# Patient Record
Sex: FEMALE | Race: WHITE | NOT HISPANIC OR LATINO | Employment: OTHER | URBAN - METROPOLITAN AREA
[De-identification: names, ages, dates, MRNs, and addresses within clinical notes are randomized per-mention and may not be internally consistent; named-entity substitution may affect disease eponyms.]

---

## 2024-10-21 ENCOUNTER — OFFICE VISIT (OUTPATIENT)
Dept: RHEUMATOLOGY | Facility: MEDICAL CENTER | Age: 49
End: 2024-10-21
Attending: STUDENT IN AN ORGANIZED HEALTH CARE EDUCATION/TRAINING PROGRAM
Payer: MEDICARE

## 2024-10-21 ENCOUNTER — APPOINTMENT (OUTPATIENT)
Dept: LAB | Facility: MEDICAL CENTER | Age: 49
End: 2024-10-21
Attending: STUDENT IN AN ORGANIZED HEALTH CARE EDUCATION/TRAINING PROGRAM
Payer: MEDICARE

## 2024-10-21 VITALS
HEIGHT: 61 IN | DIASTOLIC BLOOD PRESSURE: 78 MMHG | BODY MASS INDEX: 33.61 KG/M2 | SYSTOLIC BLOOD PRESSURE: 124 MMHG | TEMPERATURE: 98.1 F | WEIGHT: 178 LBS | HEART RATE: 76 BPM | OXYGEN SATURATION: 96 %

## 2024-10-21 DIAGNOSIS — Z79.1 NSAID LONG-TERM USE: ICD-10-CM

## 2024-10-21 DIAGNOSIS — R76.8 SEROLOGIC AUTOIMMUNITY: Primary | ICD-10-CM

## 2024-10-21 DIAGNOSIS — M25.50 ARTHRALGIA OF MULTIPLE JOINTS: ICD-10-CM

## 2024-10-21 PROCEDURE — 99204 OFFICE O/P NEW MOD 45 MIN: CPT | Performed by: STUDENT IN AN ORGANIZED HEALTH CARE EDUCATION/TRAINING PROGRAM

## 2024-10-21 PROCEDURE — 3074F SYST BP LT 130 MM HG: CPT | Performed by: STUDENT IN AN ORGANIZED HEALTH CARE EDUCATION/TRAINING PROGRAM

## 2024-10-21 PROCEDURE — 3078F DIAST BP <80 MM HG: CPT | Performed by: STUDENT IN AN ORGANIZED HEALTH CARE EDUCATION/TRAINING PROGRAM

## 2024-10-21 PROCEDURE — 99202 OFFICE O/P NEW SF 15 MIN: CPT | Performed by: STUDENT IN AN ORGANIZED HEALTH CARE EDUCATION/TRAINING PROGRAM

## 2024-10-21 RX ORDER — LISINOPRIL 40 MG/1
40 TABLET ORAL DAILY
COMMUNITY

## 2024-10-21 RX ORDER — OLOPATADINE HYDROCHLORIDE 2 MG/ML
SOLUTION/ DROPS OPHTHALMIC
COMMUNITY
Start: 2024-09-19

## 2024-10-21 RX ORDER — GALCANEZUMAB 120 MG/ML
INJECTION, SOLUTION SUBCUTANEOUS
COMMUNITY

## 2024-10-21 RX ORDER — HYDROCODONE BITARTRATE AND ACETAMINOPHEN 10; 325 MG/1; MG/1
1 TABLET ORAL
COMMUNITY
Start: 2024-09-20

## 2024-10-21 RX ORDER — BUPROPION HYDROCHLORIDE 150 MG/1
150 TABLET ORAL EVERY 24 HOURS
COMMUNITY
Start: 2024-09-21

## 2024-10-21 RX ORDER — RIZATRIPTAN BENZOATE 10 MG/1
TABLET, ORALLY DISINTEGRATING ORAL
COMMUNITY

## 2024-10-21 RX ORDER — CELECOXIB 200 MG/1
CAPSULE ORAL
COMMUNITY
Start: 2024-10-16

## 2024-10-21 RX ORDER — GLUCAGON HYDROCHLORIDE 1 MG
KIT INJECTION
COMMUNITY
Start: 2024-05-01

## 2024-10-21 ASSESSMENT — RHEUMATOLOGY NEW PATIENT QUESTIONNAIRE
NAUSEA: Y
EAR PAIN: Y
SUNLIGHT-INDUCED SKIN RASH: ARMS
FATIGUE: Y
CHARACTERISTIC: WORSE WITH ACTIVITY
DURATION: >1 HOUR
RINGING IN EARS: Y
LYMPH NODE SWELLING: ARMPITS
DIFFICULTY SWALLOWING: Y
MARK ALL THE AREAS OF PAIN: 108763813
HEARTBURN: Y
WEAKNESS: Y
JOINT INSTABILITY: Y
SPASMS: Y
ANXIETY: Y
COLD-INDUCED COLOR CHANGES (WHITE, PURPLE, RED ON REWARMING): TOES
NUMBNESS: Y
SKIN PLAQUES: Y
MUSCLE PAIN: Y
MALAISE: Y
ABDOMINAL PAIN: Y
DIZZINESS: Y
SUNLIGHT-INDUCED SKIN RASH: CHEST
COLD-INDUCED COLOR CHANGES (WHITE, PURPLE, RED ON REWARMING): FINGERS
JOINT PAIN: WORSE WITH ACTIVITY
PELVIC PAIN: Y
HEADACHES: Y
DRY EYES: Y
JOINT SWELLING: Y
VERTIGO: Y
INSOMNIA: Y
BODY ACHES: Y
EYE PAIN: Y
IRREGULAR HEARTBEATS: Y
DRY MOUTH: Y
GENITAL ULCERS: Y
ORAL ULCERS: Y
BURNING: Y
TEMPLE PAIN: Y

## 2024-11-13 ENCOUNTER — TELEPHONE (OUTPATIENT)
Dept: RHEUMATOLOGY | Facility: MEDICAL CENTER | Age: 49
End: 2024-11-13
Payer: MEDICARE

## 2024-11-13 NOTE — TELEPHONE ENCOUNTER
Pt called requesting the lab results that were done late Oct.     Results are in media.    Please advise and thank you

## 2024-11-26 ENCOUNTER — TELEPHONE (OUTPATIENT)
Dept: RHEUMATOLOGY | Facility: MEDICAL CENTER | Age: 49
End: 2024-11-26
Payer: MEDICARE

## 2024-11-26 ENCOUNTER — APPOINTMENT (OUTPATIENT)
Dept: RHEUMATOLOGY | Facility: MEDICAL CENTER | Age: 49
End: 2024-11-26
Attending: STUDENT IN AN ORGANIZED HEALTH CARE EDUCATION/TRAINING PROGRAM
Payer: MEDICARE

## 2024-11-26 NOTE — TELEPHONE ENCOUNTER
Pt called today stating she will not be back in [place] till after [place]. I explained that we are unable do out of state virtual appts so I rescheduled todays appt for earliest available in January 21st. In the meantime she did complete the additional testing ordered for her thyroid and would like to discuss results with Dr Hoover. She stated her primary looked at the results and informed her that there is definitely something going on with her thyroid possibly related to auto immune and ordered a biopsy on Dec 2nd. She was advised to follow up with her rheumatologist but due to not being able to come in she is wondering if she can receive a phone call? Please advise.

## 2024-12-08 ASSESSMENT — RHEUMATOLOGY FOLLOW-UP QUESTIONNAIRE
MUSCLE PAIN: Y
BURNING: Y
HEARTBURN: Y
EAR PAIN: Y
IRREGULAR HEARTBEATS: Y
DIZZINESS: Y
LYMPH NODE SWELLING: NECK
ANXIETY: Y
LYMPH NODE SWELLING: JAWS
DURATION: >1 HOUR
SNORING: Y
MALAISE: Y
MARK ALL THE AREAS OF PAIN: 111180991
WEAKNESS: Y
TENDON PAIN: Y
TEMPLE PAIN: Y
JOINT INSTABILITY: Y
TINGLING: Y
EYE PAIN: Y
THYROID SWELLING: Y
DRY EYES: Y
NUMBNESS: Y
JOINT PAIN: WORSE WITH ACTIVITY
INSOMNIA: Y
NAUSEA: Y
DRY MOUTH: Y
LYMPH NODE SWELLING: EARS
HEADACHES: Y
LYMPH NODE SWELLING: ARMPITS
COLD-INDUCED COLOR CHANGES (WHITE, PURPLE, RED ON REWARMING): TOES
FATIGUE: Y
CHARACTERISTIC: WORSE WITH ACTIVITY
JOINT SWELLING: Y
BODY ACHES: Y
RATE_1TO10: 7
THYROID PAIN: Y
VERTIGO: Y
SPASMS: Y
SHORTNESS OF BREATH: Y
GENITAL ULCERS: Y

## 2024-12-10 ENCOUNTER — OFFICE VISIT (OUTPATIENT)
Dept: RHEUMATOLOGY | Facility: MEDICAL CENTER | Age: 49
End: 2024-12-10
Attending: STUDENT IN AN ORGANIZED HEALTH CARE EDUCATION/TRAINING PROGRAM
Payer: MEDICARE

## 2024-12-10 VITALS
SYSTOLIC BLOOD PRESSURE: 110 MMHG | OXYGEN SATURATION: 92 % | HEART RATE: 69 BPM | BODY MASS INDEX: 34.96 KG/M2 | WEIGHT: 185 LBS | DIASTOLIC BLOOD PRESSURE: 70 MMHG | TEMPERATURE: 97.5 F | RESPIRATION RATE: 14 BRPM

## 2024-12-10 DIAGNOSIS — R76.8 SEROLOGIC AUTOIMMUNITY: Primary | ICD-10-CM

## 2024-12-10 DIAGNOSIS — Z79.1 NSAID LONG-TERM USE: ICD-10-CM

## 2024-12-10 DIAGNOSIS — M25.50 ARTHRALGIA OF MULTIPLE JOINTS: ICD-10-CM

## 2024-12-10 PROCEDURE — 99212 OFFICE O/P EST SF 10 MIN: CPT | Performed by: STUDENT IN AN ORGANIZED HEALTH CARE EDUCATION/TRAINING PROGRAM

## 2024-12-10 PROCEDURE — 3074F SYST BP LT 130 MM HG: CPT | Performed by: STUDENT IN AN ORGANIZED HEALTH CARE EDUCATION/TRAINING PROGRAM

## 2024-12-10 PROCEDURE — 99213 OFFICE O/P EST LOW 20 MIN: CPT | Performed by: STUDENT IN AN ORGANIZED HEALTH CARE EDUCATION/TRAINING PROGRAM

## 2024-12-10 PROCEDURE — 3078F DIAST BP <80 MM HG: CPT | Performed by: STUDENT IN AN ORGANIZED HEALTH CARE EDUCATION/TRAINING PROGRAM

## 2024-12-10 RX ORDER — FERROUS SULFATE 325(65) MG
10000 TABLET ORAL DAILY
COMMUNITY

## 2024-12-10 ASSESSMENT — PATIENT HEALTH QUESTIONNAIRE - PHQ9: CLINICAL INTERPRETATION OF PHQ2 SCORE: 0

## 2024-12-10 NOTE — PROGRESS NOTES
[facility] RHEUMATOLOGY  [address] [facility] [facility], [address], [place] [address]  Phone: [phone] ? Fax: [phone]  [facility]/Health-Services/Rheumatology    FOLLOW-UP VISIT NOTE      DATE OF SERVICE: 12/10/2024         Subjective     PRIMARY CARE PRACTITIONER:  Sharan Pradhan M.D.  [id number]  [facility] [id number]    PATIENT IDENTIFICATION:  Claudia Jordan  [address]  [facility] [address]    YOB: 1975    MEDICAL RECORD NUMBER: [id number]          CHIEF COMPLAINT:   Chief Complaint   Patient presents with    Follow-Up     Serologic autoimmunity       RHEUMATOLOGIC HISTORY:  Claudia Jordan is a 49 y.o. female with pertinent history notable for migraine headaches with bilateral occipital neuralgia, anxiety, lumbar spine degenerative disease, GERD, gastric bypass (2019), HTN, and polyarthralgia, who presents for follow up.     Initial consult 10/2024:  Reported a couple of years of polyarthralgia that mainly involved the neck, lower back, hips, right thumb, and left foot.  Neck pain exacerbated her migraines.  Under the care of pain management received RFA for the neck and lumbar spine degenerative disc disease.  Hip pain was more on the lateral aspect when she laid on her sides at night.  S/p SI joint injection in the past which she did not feel was particularly helpful.  S/p steroid injection to the right thumb by pain management for joint pain with activity.  History of plantar fasciitis of the left foot diagnosed by a podiatrist.  Reported swelling of the ankles that could occur throughout the day (L >R) and AM stiffness and bodyaches in the neck that lasted ~1.5 hours, improved with analgesics.  Reported cold induced purplish discoloration of her whole hand but mostly along the thumbs and cold sensitivity of the feet.  History of dry eyes x 1.5 years, used refresh at least 5-6x a day as recommended by her eye doctor as prednisone eyedrops were not effective.  She had punctal plugs x 3 in  the past which were not effective.  Her dry eyes was associated with redness, burning and sandpaper sensation.  Reported a 3-month history of dry mouth associated with painful lesions that are more like canker sores.  She had genital painful folliculitis that was been drained in the past.  No history of pathergy, parotid enlargement, photosensitive/malar rash, history of stroke, DVT/PE/multiple miscarriages, episodes of red painful photophobic eyes, nasal ulcerations, nonscarring alopecia, psoriasis, pleuritic chest pains or dyspnea on exertion.  Family history of arthritis in her father, and Sjogren's in her maternal cousin.  Pertinent treatments: Celecoxib 200 mg twice daily, diclofenac gel  Pertinent positive labs: 2024, SARA positive by IFA (1: 160, speckled pattern), antichromatin 37 (ref <20), C3 (elevated at 197)  Pertinent negative labs: 10/2024, SARA, early Sjogren's profile panel, anti-TG, TSH; 2024, anti-SSA/SSB, anti-dsDNA, anti-SM, anti-SCL 70, anti-U1 RNP, anticentromere, anti-CCP/RF, aldolase, CK, anticardiolipin Abs, C4, CRP/ESR, anti-TPO, TSH/free T4, 2023, BMP  Pertinent imagin/2024 left foot XR: Degenerative changes along the metatarsal head.  No calcification seen along the plantar fascia, small Achilles insertional plantar spur noted  2024 right hand XR: Osteophyte noted.  No erosive changes.  2023 MRI lumbar spine: R lateral disc bulge at L4-L5, with an annular fissure likely resulting R L4 nerve root encroachment with mild canal stenosis.  Disc bulge at L5-S1 could result in L L5 nerve root encroachment.  Facet arthrosis with fluid in the facets, most notable at L3 with facet arthrosis also present at L4-5 and L5-S1.   R hip/pelvis XR: Unremarkable  2022 MRI C-spine: Degenerative disc disease at the C5-6 and C6/7 level posterior disc osteophyte complex with narrowing of the anterior subarachnoid space but no cervical spinal stenosis.    INTERVAL HISTORY:  Reports  interval history as noted on the questionnaire below or scanned under media tab.    Patient presents to review results of recent labs.  She recently had an FNA of one of the thyroid nodules which showed benign findings.  No significant changes to previously reported symptoms.    [facility] Rheumatology Established Patient History Form    12/8/2024 11:33 PM PST - Filed by Patient   MAIN REASON FOR VISIT Auto immune Test Results, Thyroid   INTERVAL HISTORY OF ILLNESS   Date of worsening onset:    Preceding incident/ailment:    Describe/list your symptoms:    Exacerbating factors:    Alleviating factors:    Helpful medications:    Ineffective medications:    Severity of pain (scale of 1-10): 7   Personal/emotional stressors:    E[place] All The Areas Of Pain    REVIEW OF SYMPTOMS    General   Fevers    Chills    Night sweats    Malaise Yes   Fatigue Yes   Unintentional weight loss    Musculoskeletal   Joint pain Worse with activity   Morning stiffness duration >1 hour   Morning stiffness characteristic Worse with activity   Joint swelling Yes   Joint instability Yes   Tendon pain Yes   Muscle pain Yes   Body aches Yes   Dermatologic   Hair loss with bald spots    Hair shedding    Skin thickening    Skin plaques    Sunlight-induced skin rash    Cold-induced color changes (white, purple, red on rewarming) Toes   Neurologic/Psychiatric   Weakness Yes   Spasms Yes   Tingling Yes   Burning Yes   Numbness Yes   Insomnia Yes   Anxiety Yes   Depression    Head/Eyes   Headaches Yes   Temple pain Yes   Dizziness Yes   Dry eyes Yes   Eye pain Yes   Eye redness    Blurry vision    Vision loss    Ears/Nose   Ear pain Yes   Ringing in ears    Vertigo Yes   Hearing loss    Nasal ulcers    Nosebleeds    Sinus pain    Nasal congestion    Snoring Yes   Mouth/Throat   Oral ulcers    Bleeding gums    Dry mouth Yes   Cavities    Sore throat    Sticking in throat    Difficulty speaking    Neck/Lymphatics   Thyroid pain Yes   Thyroid swelling Yes    Lymph node swelling Ears;  Jaws;  Neck;  Armpits   Cardiac/Respiratory   Chest pain with breathing    Dry cough    Cough with bloody phlegm    Shortness of breath Yes   Fast heartbeats    Irregular heartbeats Yes   Gastrointestinal   Nausea Yes   Vomiting    Difficulty swallowing    Heartburn Yes   Abdominal pain    Bloody stool    Mucus stool    Genitourinary   Pelvic pain    Genital ulcers Yes   Abnormal discharge    Burning urination    Frothy urine    Blood in urine        REVIEW OF SYSTEMS:  Except as noted in the history above, relevant review of systems with emphasis on autoimmune rheumatic diseases was otherwise negative.      ACTIVE PROBLEM LIST:  Serologic autoimmunity    PAST MEDICAL HISTORY:  Past Medical History:   Diagnosis Date    Bartholin cyst     UTI (lower urinary tract infection)        PAST SURGICAL HISTORY:  Past Surgical History:   Procedure Laterality Date    CYST EXCISION         MEDICATIONS:  Current Outpatient Medications   Medication Sig    cholecalciferol (VITAMIN D3 MAXIMUM STRENGTH) 5000 UNIT Cap Take 10,000 Units by mouth every day.    HYDROcodone/acetaminophen (NORCO)  MG Tab Take 1 Tablet by mouth. (Patient taking differently: Take 1 Tablet by mouth 3 times a day.)    PANTOPRAZOLE SODIUM PO Take 40 mg by mouth.    olopatadine HCl (PATADAY) 0.2 % ophthalmic solution     lisinopril (PRINIVIL) 40 MG tablet Take 40 mg by mouth every day.    buPROPion (WELLBUTRIN XL) 150 MG XL tablet Take 150 mg by mouth every 24 hours.    celecoxib (CELEBREX) 200 MG Cap GENERIC FOR CLEBREX. TAKE 1 CAPSULE BY MOUTH TWICE DAILY    diclofenac sodium (VOLTAREN) 1 % Gel APPLY TOPICALLY TO THE AFFECTED AREA FOUR TIMES DAILY    glucagon (GLUCAGEN HYPOKIT) 1 MG Recon Soln     EMGALITY 120 MG/ML Solution Auto-injector ADMINISTER 1 ML UNDER THE SKIN EVERY 30 DAYS    rizatriptan (MAXALT-MLT) 10 MG disintegrating tablet PLACE 1 TABLET INSIDE CHEEK EVERY 2 HOURS AS NEEDED FOR MIGRAINE     clotrimazole-betamethasone (LOTRISONE) 1-0.05 % CREA Apply  to affected area(s) 2 times a day. m    triamcinolone acetonide (KENALOG) 0.1 % CREA Apply  to affected area(s) 2 times a day. .    clindamycin (CLEOCIN) 300 MG CAPS Take 1 Cap by mouth 3 times a day. (Patient not taking: Reported on 12/10/2024)       ALLERGIES:   Allergies   Allergen Reactions    Gabapentin Unspecified     hallucinations    Lyrica [Pregabalin] Anxiety     Flush, rapid heart beat    Penicillins      Rash,labored breathing       IMMUNIZATIONS:   Immunization History   Administered Date(s) Administered    Chidi SARS-CoV-2 Vaccine 09/09/2021, 01/10/2022       SOCIAL HISTORY:   Social History     Socioeconomic History    Marital status:    Tobacco Use    Smoking status: Never   Substance and Sexual Activity    Alcohol use: Yes     Comment: occ    Drug use: No    Sexual activity: Yes     Partners: Male     Social Drivers of Health     Financial Resource Strain: Patient Declined (12/10/2024)    Received from [facility] [employer], [facility] [employer], and [facility]    Overall Financial Resource Strain (CARDIA)     Difficulty of Paying Living Expenses: Patient declined   Food Insecurity: Patient Declined (12/10/2024)    Received from [facility] [employer], [facility] [employer] [facility] the [facility], and [facility]    Hunger Vital Sign     Worried About Running Out of Food in the Last Year: Patient declined     Ran Out of Food in the Last Year: Patient declined   Transportation Needs: Patient Declined (12/10/2024)    Received from [facility] [employer], [facility] [employer], and [facility]    PRAPARE - Transportation     Lack of Transportation (Medical): Patient declined     Lack of Transportation (Non-Medical): Patient declined   Physical Activity: Sufficiently Active (10/18/2024)    Received from [facility]    Exercise Vital Sign     Days of Exercise per Week: 5 days     Minutes of Exercise per Session: 60 min   Stress: Patient Declined (10/18/2024)     Received from [facility]    [employer] - Occupational Stress Questionnaire     Feeling of Stress : Patient declined   Social Connections: Unknown (10/18/2024)    Received from [facility]    Social Connection and Isolation Panel [NHANES]     Frequency of Communication with Friends and Family: Once a week     Frequency of Social Gatherings with Friends and Family: Once a week     Attends Confucianism Services: Patient declined     Active Member of Clubs or Organizations: No     Attends Club or Organization Meetings: Never     Marital Status: Never    Intimate Partner Violence: Not At Risk (10/18/2024)    Received from [facility]    Humiliation, Afraid, Rape, and Kick questionnaire     Fear of Current or Ex-Partner: No     Emotionally Abused: No     Physically Abused: No     Sexually Abused: No   Housing Stability: Unknown (12/10/2024)    Received from [facility] [employer], [facility] [employer], [facility], and [facility]    Housing Stability Vital Sign     Unable to Pay for Housing in the Last Year: Patient declined       FAMILY HISTORY:  Family History   Problem Relation Age of Onset    Diabetes Mother     Heart Disease Mother     Diabetes Maternal Grandmother     Diabetes Maternal Grandfather             Objective     Vital Signs: /70   Pulse 69   Temp 36.4 °C (97.5 °F) (Temporal)   Resp 14   Wt 83.9 kg (185 lb)   SpO2 92% Body mass index is 34.96 kg/m².    General: Appears well and comfortable  Eyes: No scleral or conjunctival lesions  Head/Neck: No apparent thyromegaly or adenopathy  Respiratory: Breathing quiet and unlabored  Integumentary: No significant cutaneous lesions on exposed skin  Musculoskeletal: No synovitis   Neurologic: Nonfocal  Psychiatric: Mood and affect appropriate    LABORATORY RESULTS REVIEWED AND INTERPRETED BY ME:  Lab Results   Component Value Date/Time    WBC 7.6 01/10/2019 04:54 PM    HEMOGLOBIN 13.5 01/10/2019 04:54 PM     HEMATOCRIT 42.0 01/10/2019 04:54 PM    MCV 97 01/10/2019 04:54 PM    MCH 31.1 01/10/2019 04:54 PM    MCHC 32.1 01/10/2019 04:54 PM    RDW 12.2 01/10/2019 04:54 PM    PLATELETCT 250 01/10/2019 04:54 PM    NEUTS 3.8 01/10/2019 04:54 PM    LYMPHOCYTES 37 01/10/2019 04:54 PM    MONOCYTES 8 01/10/2019 04:54 PM    EOSINOPHILS 4 01/10/2019 04:54 PM    BASOPHILS 1 01/10/2019 04:54 PM     Lab Results   Component Value Date/Time    ASTSGOT 15 01/10/2019 04:54 PM    ALTSGPT 25 01/10/2019 04:54 PM    ALKPHOSPHAT 78 01/10/2019 04:54 PM    TBILIRUBIN 0.3 01/10/2019 04:54 PM    TOTPROTEIN 7.5 01/10/2019 04:54 PM    ALBUMIN 3.9 01/10/2019 04:54 PM     Lab Results   Component Value Date/Time    SODIUM 141 01/10/2019 04:54 PM    POTASSIUM 4.0 01/10/2019 04:54 PM    CHLORIDE 107 01/10/2019 04:54 PM    CO2 28 01/10/2019 04:54 PM    GLUCOSE 89 01/10/2019 04:54 PM    BUN 13 01/10/2019 04:54 PM    CREATININE 0.71 01/10/2019 04:54 PM    CALCIUM 9.1 01/10/2019 04:54 PM     Lab Results   Component Value Date/Time    COLORURINE Yellow 08/17/2011 01:12 PM    SPECGRAVITY 1.025 08/17/2011 01:12 PM    PHURINE 5.0 08/17/2011 01:12 PM    GLUCOSEUR Negative 08/17/2011 01:12 PM    KETONES Negative 08/17/2011 01:12 PM    PROTEINURIN Negative 08/17/2011 01:12 PM     Lab Results   Component Value Date/Time    HBA1C 6.1 (A) 10/22/2024 12:00 AM       RADIOLOGY RESULTS REVIEWED AND INTERPRETED BY ME: See above      All relevant laboratory and imaging results reported on this note were reviewed and interpreted by me.         Assessment & Plan     Claudia Jordan is a 49 y.o. female with history as noted above whose presentation merits the following clinical impressions and recommendations:    1. Serologic autoimmunity  SARA positive by IFA (1: 160, speckled pattern), antichromatin 37 (ref <20), C3 (elevated at 197), negative SARA on repeat  Serologic evidence of immunologic activity without meeting the diagnostic criteria for any underlying SARA-associated  autoimmune disease, such as Sjogren syndrome, systemic lupus, inflammatory myopathy, or systemic sclerosis. Notably, the SARA test is highly sensitive and lacks diagnostic specificity as it can be seen in a variety of non-rheumatic conditions, such as acute or chronic bacterial or viral infections (presumably via molecular mimicry), autoimmune thyroid disease (Hashimoto's thyroiditis or Graves' disease), autoimmune hepatobiliary disease, inflammatory bowel disease, and lymphoproliferative disease or malignancy, as well as in over 10% of healthy individuals with no clinical evidence of autoimmune rheumatic disease.  Negative SARA and early Sjogren's profile panel makes Sjogren disease unlikely the etiology of her sicca symptoms.  - No further immunologic lab testing necessary at this time  - -For dry eyes; consider preservative-free artificial tears (refresh, TheraTears, soothe, Systane).  These are generally less irritating. Consider lubricating ointment such as refresh PM or Lacri-Lube which are typically used during the night.  - For dry mouth; a trial of sugar-free or xylitol containing gum, lozenges, or XyliMelts to help stimulate salivary flow, in addition to Biotene mouthwash, frequent sips of water, and  regular dental visits.    2. Arthralgia of multiple joints  Biomechanical arthralgia secondary to osteoarthritis rather than inflammatory arthritis.  The current clinical evidence does not support the presence of an underlying autoimmune inflammatory arthritis, such as rheumatoid arthritis, spondyloarthritis, or lupus-spectrum arthritis.   - Continue Voltaren 1% gel 3-4 times daily as needed, can also consider topical lidocaine or capsaicin  - May benefit from paraffin wax bath for the hands, arthritis gloves, splinting for the CMC joints  - Extra strength Tylenol or oral NSAIDs prn  - Consider intra-articular steroid injection if that becomes necessary      3. NSAID long-term use  Counseled on the potential  adverse effects of long-term NSAID use, especially on the stomach, kidneys, and hematopoietic systems, and the need to take them with food and stay hydrated with enough water.  - Recommend NSAIDs as needed rather than daily      The above assessment and plan were discussed with the patient who acknowledged understanding of the plan.    FOLLOW-UP: Return TBD.         Thank you for the opportunity to participate in the care of Claudia Jordan.    Pina Hoover D.O.  Rheumatologist

## 2024-12-11 NOTE — PATIENT INSTRUCTIONS
Thank you for visiting our clinic today.     Summary of your visit:    - For dry eyes; consider preservative-free artificial tears (refresh, TheraTears, soothe, Systane).  These are generally less irritating. Consider lubricating ointment such as refresh PM or Lacri-Lube which are typically used during the night.    - For dry mouth; a trial of sugar-free or xylitol containing gum, lozenges, or XyliMelts to help stimulate salivary flow, in addition to Biotene mouthwash, frequent sips of water, and  regular dental visits.    [facility] AFTER [facility]  Below are important guidelines to help you navigate your health care needs and assist us in caring for you safely and  effectively. We encourage you to carefully read and understand this information and adhere to them accordingly.    [facility] Messaging and Phone Calls:   Diagnosis and Treatment - For a detailed explanation of your condition and treatment plan from today’s visit, refer  to the visit note on [facility] [facility] [employer] via the following steps:  o Log in to [facility] and click on “Visits” at the top.  o Scroll down to “Past Visits” under Appointments.  o Click on “View Notes” under the appropriate visit date.   Questions or Concerns - [facility] [employer] messaging is for non-urgent matters that do not require immediate attention and  should be brief with no more than two questions or concerns. If you have multiple questions or concerns, we ask that  you schedule an appointment to have them properly addressed.   Response to Messages - [facility] messages are addressed throughout the week depending on clinical availability,  so we ask that you allow up to one week for a response.   Phone Calls and Voicemails - Phone calls and voicemail messages are reserved for time-sensitive matters that  cannot wait to be addressed via [facility] [employer]. We ask that you refrain from calling the office multiple times or leaving  multiple voicemails regarding the same issue as doing so may lead  to delays in response time.   Urgent Issues - For urgent medical matters or medical emergencies that cannot wait, you are advised to go to your  nearest Urgent Care or Emergency Department for immediate attention.    Laboratory Tests and Imaging Studies:   Future Lab and Imaging Orders - We ask that you get your lab tests and imaging studies done no later than one  week before your follow-up visit unless instructed otherwise.   Results Communication - You may see some test results marked as “abnormal” that are not necessarily significant  or concerning. If there are significant abnormalities on your test results that warrant further action, you will be notified  via MyChart or phone call, otherwise they will be addressed at your follow-up visit.    Prescriptions and Refill Requests:   General Prescriptions (e.g. prednisone, hydroxychloroquine, leflunomide, methotrexate, etc.) - These are sent  to Retail Pharmacies, so all refill requests of these medications should be directed to your local pharmacy.   Specialty Prescriptions (e.g. Enbrel, Humira, Cosentyx, Xeljanz, etc.) - These are sent to Specialty Pharmacies,  so all refill requests of these medications should be directed to your designated specialty pharmacy.   Infusion Prescriptions (e.g. Remicade, Simponi Aria, Rituxan, Saphnelo, etc.) - These are sent to Outpatient  Infusion Centers, so all scheduling requests of these medications should be directed to your local infusion center.    Medication Risks and Adverse Effects:   Immunosuppressed Status - Steroids and antirheumatic drugs are immunosuppressants, so they increase the risk  of infections and can have side effects on various organ systems in your body, though most of them are uncommon.   Potential Side Effects - Be sure to read the drug package inserts to learn about the potential side effects of your  medications before you start taking them and take them exactly as prescribed unless instructed  otherwise.   In Case of Side Effects - If you experience any significant side effects, stop taking the medication immediately and  promptly notify the prescriber. Depending on the severity of the side effects, consider going to an Urgent Care or  Emergency Department for immediate attention.    Immunizations and Health Screening:   Vaccinations - If you are on immunosuppressive therapy, it is important that you are up to date on age-appropriate  immunizations, particularly shingles and pneumonia vaccines, which you can request from your primary care provider  or from us at your next appointment.   Screening Tests - It is also important that you are up to date on age-appropriate screening tests, such as pap  smear, mammography, and colonoscopy, which you can request from your primary care provider.    Educational and Supportive Resources:   [facility] ([email or web address]) - Visit our website to learn more about  your condition and other rheumatic diseases, and gain access to many helpful resources for them.   Disposal of Old Medications (www.tio.gov/everyday-takeback-day) - Visit the Drug Enforcement Administration  website to find a nearby location where you can properly dispose of old medications you no longer need.   Disposal of Used [place] (www.safeneedledisposal.org) - Visit the Safe Needle Disposal Organization website to  find a nearby location where you can properly dispose of used needles from your injectable medications.  Revised 6/14/2024